# Patient Record
Sex: FEMALE | Race: BLACK OR AFRICAN AMERICAN | NOT HISPANIC OR LATINO | URBAN - METROPOLITAN AREA
[De-identification: names, ages, dates, MRNs, and addresses within clinical notes are randomized per-mention and may not be internally consistent; named-entity substitution may affect disease eponyms.]

---

## 2019-11-09 ENCOUNTER — EMERGENCY (EMERGENCY)
Facility: HOSPITAL | Age: 34
LOS: 1 days | Discharge: HOME | End: 2019-11-09
Attending: EMERGENCY MEDICINE | Admitting: EMERGENCY MEDICINE
Payer: COMMERCIAL

## 2019-11-09 VITALS
DIASTOLIC BLOOD PRESSURE: 84 MMHG | HEART RATE: 84 BPM | OXYGEN SATURATION: 100 % | RESPIRATION RATE: 17 BRPM | TEMPERATURE: 98 F | SYSTOLIC BLOOD PRESSURE: 145 MMHG

## 2019-11-09 VITALS
OXYGEN SATURATION: 100 % | TEMPERATURE: 98 F | SYSTOLIC BLOOD PRESSURE: 139 MMHG | DIASTOLIC BLOOD PRESSURE: 90 MMHG | HEART RATE: 94 BPM | RESPIRATION RATE: 16 BRPM

## 2019-11-09 PROCEDURE — 99282 EMERGENCY DEPT VISIT SF MDM: CPT | Mod: 25

## 2019-11-09 PROCEDURE — 12001 RPR S/N/AX/GEN/TRNK 2.5CM/<: CPT | Mod: F3

## 2019-11-09 RX ORDER — ACETAMINOPHEN 500 MG
650 TABLET ORAL ONCE
Refills: 0 | Status: COMPLETED | OUTPATIENT
Start: 2019-11-09 | End: 2019-11-09

## 2019-11-09 RX ADMIN — Medication 650 MILLIGRAM(S): at 18:17

## 2019-11-09 NOTE — ED PROVIDER NOTE - CLINICAL SUMMARY MEDICAL DECISION MAKING FREE TEXT BOX
34F no pmh presenting from [employer], is employee here, has superficial lac to left fourth finger medial aspect, no neurovascular complication, will dermabond after cleaning. No other issues, d/c after PEP packet with RN.

## 2019-11-09 NOTE — ED PROVIDER NOTE - PATIENT PORTAL LINK FT
You can access the FollowMyHealth Patient Portal offered by [facility] by registering at the following website: [email or web address]. By joining [employer]’s FollowMyHealth portal, you will also be able to view your health information using other applications (apps) compatible with our system.

## 2019-11-09 NOTE — ED PROVIDER NOTE - CARE PROVIDER_API CALL
Lukasz Schmitz (DO)  Plastic Surgery  [address] [place] [address]  Phone: [phone]  Fax: [phone]  Follow Up Time: Urgent

## 2019-11-09 NOTE — ED PROVIDER NOTE - OBJECTIVE STATEMENT
Just PTA patient was at a crash  in L&D when she had a scalpel injury to her proximal ring finger, approx 1cm, medial aspect. No other issues, no neuro compromise, no active bleeding.

## 2019-11-09 NOTE — ED ADULT NURSE NOTE - OBJECTIVE STATEMENT
Pt received to room 8. Pt came from [employer], is an employee, has a laceration to the left hand during a , was exposed to that pt's hand via scalpel. Pt appears to be in pain, Dr. Chavarria at bedside perform laceration repair. Radial pulses present to bilat, is able to wiggle all fingers on left hand. Pt is A&Ox4, ambulates independently. Respirations even & unlabored.

## 2019-11-09 NOTE — ED PROCEDURE NOTE - ATTENDING CONTRIBUTION TO CARE
Aayush:  I was present for the critical portions of this procedure and provided direct attending supervision.

## 2019-11-09 NOTE — ED PROVIDER NOTE - NSFOLLOWUPINSTRUCTIONS_ED_ALL_ED_FT
Keep the wound dry for the first 48 hours. Afterward, allow soapy water to run over the area, but do not scrub or immerse in water. Follow up with the hand surgeon. Return to this Emergency Department if you experience fever, redness around or streaking from the area, thick drainage, or any other emergency.

## 2019-11-09 NOTE — ED PROVIDER NOTE - PHYSICAL EXAMINATION
Gen: NAD, non-toxic, conversational  Eyes: PERRLA, EOMI   HENT: Normocephalic, atraumatic. External ears normal, no rhinorrhea, moist mucous membranes.   CV: RRR, no M/R/G  Resp: CTAB, non-labored, speaking without difficulty on room air  Abd: soft, non tender, non rigid, no guarding or rebound tenderness  Back: No CVAT bilaterally, no midline ttp  Skin: dry, wwp, left fourth digit medial aspect just distal to MCP oblique orientation superficial laceration   Neuro: AOx3, speech is fluent and appropriate, left fingers all intact extensor and flexor.   Psych: Mood concerned, affect euthymic

## 2019-11-09 NOTE — ED ADULT TRIAGE NOTE - CHIEF COMPLAINT QUOTE
arrives from  [facility]. [facility] blood exposure from scalpel during c section from pt.  laceration noted to l finger.

## 2019-11-09 NOTE — ED PROVIDER NOTE - NSCAREINITIATED _GEN_ER
"Adolescent Privilege Time    Date: ____[id number]_______________________    Time 12:30-1:00pm or __________________________    Skills Taught: ([place]) How to enjoy leisure activities    Other__________________________________________________________________      Behaviors Noted:([place])      Active     Introverted    Shy     Irritating  Rude       Spiteful    Interested    Apathetic       Impulsive  Bossy         Catty      Jolly    Impatient     Aggressive     Invasive    Opinionates   Careless   Argumentative    [place]       Inconsiderate  Distracted  Loud          Withdrawn  Took turns    Annoying      Reactive        Kind        Thoughtful  Lacks awareness of personal space    Explain: Patient participated in a game of \"Apples to Apples\" with peers and presented positive social behaviors.  No negative behaviors noted.  " Sussy Looney(Attending)

## 2019-11-09 NOTE — ED PROVIDER NOTE - ATTENDING CONTRIBUTION TO CARE
Aayush: 33 yo female s/p scalpel injury during a stat c section in L&D. Pt sustained laceration to left fourth digits. Vaccinations UTD including Tdap and Hepatitis B. NO other injuries or complaints. Exam: GENERAL: well appearing, NAD, HEENT: MMM, PERRLA, CARDIO: +S1/S2, no murmurs, rubs or gallops, LUNGS: CTA B/L, no wheezing, rales or rhonchi, ABD: soft, nontender, BSx4 quadrants, no guarding or rigidity. EXT: superficial fourth left didgit laceration on proximal medial aspect- minimal active bleeding, no sub q tissue exposed. NVI distally NEURO: AxOx3, SKIN: as documented above A/P- 33 yo female with occupation exposure and laceration. Pt does not require sutures- skin glue applied. vaccines UTD. pt give PEP and will f/u with employee health on monday.

## 2019-11-09 NOTE — ED ADULT NURSE NOTE - CHIEF COMPLAINT QUOTE
arrives from  [facility]. [facility] blood exposure from scalpel during c section from pt.  laceration noted to l finger.

## 2020-04-26 ENCOUNTER — MESSAGE (OUTPATIENT)
Age: 35
End: 2020-04-26

## 2020-05-11 LAB
SARS-COV-2 IGG SERPL IA-ACNC: 0.1 INDEX
SARS-COV-2 IGG SERPL QL IA: NEGATIVE

## 2021-03-10 ENCOUNTER — EMERGENCY (EMERGENCY)
Facility: HOSPITAL | Age: 36
LOS: 1 days | Discharge: HOME | End: 2021-03-10
Attending: EMERGENCY MEDICINE | Admitting: EMERGENCY MEDICINE
Payer: COMMERCIAL

## 2021-03-10 ENCOUNTER — OUTPATIENT (OUTPATIENT)
Dept: INPATIENT UNIT | Facility: HOSPITAL | Age: 36
LOS: 1 days | End: 2021-03-10
Payer: COMMERCIAL

## 2021-03-10 VITALS
DIASTOLIC BLOOD PRESSURE: 90 MMHG | SYSTOLIC BLOOD PRESSURE: 130 MMHG | TEMPERATURE: 99 F | HEART RATE: 63 BPM | OXYGEN SATURATION: 100 % | RESPIRATION RATE: 17 BRPM

## 2021-03-10 VITALS
SYSTOLIC BLOOD PRESSURE: 145 MMHG | DIASTOLIC BLOOD PRESSURE: 91 MMHG | TEMPERATURE: 98 F | RESPIRATION RATE: 18 BRPM | HEART RATE: 70 BPM | OXYGEN SATURATION: 100 %

## 2021-03-10 DIAGNOSIS — R55 SYNCOPE AND COLLAPSE: ICD-10-CM

## 2021-03-10 LAB
ALBUMIN SERPL ELPH-MCNC: 4 G/DL — SIGNIFICANT CHANGE UP (ref 3.3–5)
ALBUMIN SERPL ELPH-MCNC: 4.7 G/DL — SIGNIFICANT CHANGE UP (ref 3.3–5)
ALP SERPL-CCNC: 72 U/L — SIGNIFICANT CHANGE UP (ref 40–120)
ALP SERPL-CCNC: 85 U/L — SIGNIFICANT CHANGE UP (ref 40–120)
ALT FLD-CCNC: 11 U/L — SIGNIFICANT CHANGE UP (ref 4–33)
ALT FLD-CCNC: 14 U/L — SIGNIFICANT CHANGE UP (ref 4–33)
ANION GAP SERPL CALC-SCNC: 10 MMOL/L — SIGNIFICANT CHANGE UP (ref 7–14)
ANION GAP SERPL CALC-SCNC: 15 MMOL/L — HIGH (ref 7–14)
APTT BLD: 28.2 SEC — SIGNIFICANT CHANGE UP (ref 27–36.3)
AST SERPL-CCNC: 16 U/L — SIGNIFICANT CHANGE UP (ref 4–32)
AST SERPL-CCNC: 18 U/L — SIGNIFICANT CHANGE UP (ref 4–32)
BASOPHILS # BLD AUTO: 0.03 K/UL — SIGNIFICANT CHANGE UP (ref 0–0.2)
BASOPHILS # BLD AUTO: 0.04 K/UL — SIGNIFICANT CHANGE UP (ref 0–0.2)
BASOPHILS NFR BLD AUTO: 0.4 % — SIGNIFICANT CHANGE UP (ref 0–2)
BASOPHILS NFR BLD AUTO: 0.7 % — SIGNIFICANT CHANGE UP (ref 0–2)
BILIRUB SERPL-MCNC: 0.4 MG/DL — SIGNIFICANT CHANGE UP (ref 0.2–1.2)
BILIRUB SERPL-MCNC: 0.4 MG/DL — SIGNIFICANT CHANGE UP (ref 0.2–1.2)
BLD GP AB SCN SERPL QL: NEGATIVE — SIGNIFICANT CHANGE UP
BUN SERPL-MCNC: 10 MG/DL — SIGNIFICANT CHANGE UP (ref 7–23)
BUN SERPL-MCNC: 11 MG/DL — SIGNIFICANT CHANGE UP (ref 7–23)
CALCIUM SERPL-MCNC: 8.7 MG/DL — SIGNIFICANT CHANGE UP (ref 8.4–10.5)
CALCIUM SERPL-MCNC: 9.4 MG/DL — SIGNIFICANT CHANGE UP (ref 8.4–10.5)
CHLORIDE SERPL-SCNC: 100 MMOL/L — SIGNIFICANT CHANGE UP (ref 98–107)
CHLORIDE SERPL-SCNC: 104 MMOL/L — SIGNIFICANT CHANGE UP (ref 98–107)
CK MB BLD-MCNC: 1.1 % — SIGNIFICANT CHANGE UP (ref 0–2.5)
CK MB CFR SERPL CALC: 1.7 NG/ML — SIGNIFICANT CHANGE UP
CK SERPL-CCNC: 149 U/L — SIGNIFICANT CHANGE UP (ref 25–170)
CO2 SERPL-SCNC: 23 MMOL/L — SIGNIFICANT CHANGE UP (ref 22–31)
CO2 SERPL-SCNC: 24 MMOL/L — SIGNIFICANT CHANGE UP (ref 22–31)
CREAT SERPL-MCNC: 0.62 MG/DL — SIGNIFICANT CHANGE UP (ref 0.5–1.3)
CREAT SERPL-MCNC: 0.67 MG/DL — SIGNIFICANT CHANGE UP (ref 0.5–1.3)
EOSINOPHIL # BLD AUTO: 0.03 K/UL — SIGNIFICANT CHANGE UP (ref 0–0.5)
EOSINOPHIL # BLD AUTO: 0.04 K/UL — SIGNIFICANT CHANGE UP (ref 0–0.5)
EOSINOPHIL NFR BLD AUTO: 0.5 % — SIGNIFICANT CHANGE UP (ref 0–6)
EOSINOPHIL NFR BLD AUTO: 0.6 % — SIGNIFICANT CHANGE UP (ref 0–6)
GLUCOSE SERPL-MCNC: 81 MG/DL — SIGNIFICANT CHANGE UP (ref 70–99)
GLUCOSE SERPL-MCNC: 87 MG/DL — SIGNIFICANT CHANGE UP (ref 70–99)
HCT VFR BLD CALC: 37.3 % — SIGNIFICANT CHANGE UP (ref 34.5–45)
HCT VFR BLD CALC: 39.9 % — SIGNIFICANT CHANGE UP (ref 34.5–45)
HGB BLD-MCNC: 11.7 G/DL — SIGNIFICANT CHANGE UP (ref 11.5–15.5)
HGB BLD-MCNC: 13.2 G/DL — SIGNIFICANT CHANGE UP (ref 11.5–15.5)
IANC: 3.2 K/UL — SIGNIFICANT CHANGE UP (ref 1.5–8.5)
IANC: 4.38 K/UL — SIGNIFICANT CHANGE UP (ref 1.5–8.5)
IMM GRANULOCYTES NFR BLD AUTO: 0.2 % — SIGNIFICANT CHANGE UP (ref 0–1.5)
IMM GRANULOCYTES NFR BLD AUTO: 0.4 % — SIGNIFICANT CHANGE UP (ref 0–1.5)
INR BLD: 0.99 RATIO — SIGNIFICANT CHANGE UP (ref 0.88–1.16)
LACTATE SERPL-SCNC: 2.4 MMOL/L — HIGH (ref 0.5–2)
LYMPHOCYTES # BLD AUTO: 1.68 K/UL — SIGNIFICANT CHANGE UP (ref 1–3.3)
LYMPHOCYTES # BLD AUTO: 3.23 K/UL — SIGNIFICANT CHANGE UP (ref 1–3.3)
LYMPHOCYTES # BLD AUTO: 31.3 % — SIGNIFICANT CHANGE UP (ref 13–44)
LYMPHOCYTES # BLD AUTO: 39.2 % — SIGNIFICANT CHANGE UP (ref 13–44)
MAGNESIUM SERPL-MCNC: 1.9 MG/DL — SIGNIFICANT CHANGE UP (ref 1.6–2.6)
MAGNESIUM SERPL-MCNC: 2 MG/DL — SIGNIFICANT CHANGE UP (ref 1.6–2.6)
MCHC RBC-ENTMCNC: 28.5 PG — SIGNIFICANT CHANGE UP (ref 27–34)
MCHC RBC-ENTMCNC: 29.1 PG — SIGNIFICANT CHANGE UP (ref 27–34)
MCHC RBC-ENTMCNC: 31.4 GM/DL — LOW (ref 32–36)
MCHC RBC-ENTMCNC: 33.1 GM/DL — SIGNIFICANT CHANGE UP (ref 32–36)
MCV RBC AUTO: 88.1 FL — SIGNIFICANT CHANGE UP (ref 80–100)
MCV RBC AUTO: 91 FL — SIGNIFICANT CHANGE UP (ref 80–100)
MONOCYTES # BLD AUTO: 0.4 K/UL — SIGNIFICANT CHANGE UP (ref 0–0.9)
MONOCYTES # BLD AUTO: 0.52 K/UL — SIGNIFICANT CHANGE UP (ref 0–0.9)
MONOCYTES NFR BLD AUTO: 6.3 % — SIGNIFICANT CHANGE UP (ref 2–14)
MONOCYTES NFR BLD AUTO: 7.5 % — SIGNIFICANT CHANGE UP (ref 2–14)
NEUTROPHILS # BLD AUTO: 3.2 K/UL — SIGNIFICANT CHANGE UP (ref 1.8–7.4)
NEUTROPHILS # BLD AUTO: 4.38 K/UL — SIGNIFICANT CHANGE UP (ref 1.8–7.4)
NEUTROPHILS NFR BLD AUTO: 53.2 % — SIGNIFICANT CHANGE UP (ref 43–77)
NEUTROPHILS NFR BLD AUTO: 59.7 % — SIGNIFICANT CHANGE UP (ref 43–77)
NRBC # BLD: 0 /100 WBCS — SIGNIFICANT CHANGE UP
NRBC # BLD: 0 /100 WBCS — SIGNIFICANT CHANGE UP
NRBC # FLD: 0 K/UL — SIGNIFICANT CHANGE UP
NRBC # FLD: 0 K/UL — SIGNIFICANT CHANGE UP
PHOSPHATE SERPL-MCNC: 2.9 MG/DL — SIGNIFICANT CHANGE UP (ref 2.5–4.5)
PLATELET # BLD AUTO: 318 K/UL — SIGNIFICANT CHANGE UP (ref 150–400)
PLATELET # BLD AUTO: 380 K/UL — SIGNIFICANT CHANGE UP (ref 150–400)
POTASSIUM SERPL-MCNC: 3.3 MMOL/L — LOW (ref 3.5–5.3)
POTASSIUM SERPL-MCNC: 3.8 MMOL/L — SIGNIFICANT CHANGE UP (ref 3.5–5.3)
POTASSIUM SERPL-SCNC: 3.3 MMOL/L — LOW (ref 3.5–5.3)
POTASSIUM SERPL-SCNC: 3.8 MMOL/L — SIGNIFICANT CHANGE UP (ref 3.5–5.3)
PROT SERPL-MCNC: 6.9 G/DL — SIGNIFICANT CHANGE UP (ref 6–8.3)
PROT SERPL-MCNC: 8.2 G/DL — SIGNIFICANT CHANGE UP (ref 6–8.3)
PROTHROM AB SERPL-ACNC: 11.4 SEC — SIGNIFICANT CHANGE UP (ref 10.6–13.6)
RBC # BLD: 4.1 M/UL — SIGNIFICANT CHANGE UP (ref 3.8–5.2)
RBC # BLD: 4.53 M/UL — SIGNIFICANT CHANGE UP (ref 3.8–5.2)
RBC # FLD: 12 % — SIGNIFICANT CHANGE UP (ref 10.3–14.5)
RBC # FLD: 12.2 % — SIGNIFICANT CHANGE UP (ref 10.3–14.5)
RH IG SCN BLD-IMP: POSITIVE — SIGNIFICANT CHANGE UP
SARS-COV-2 RNA SPEC QL NAA+PROBE: SIGNIFICANT CHANGE UP
SODIUM SERPL-SCNC: 138 MMOL/L — SIGNIFICANT CHANGE UP (ref 135–145)
SODIUM SERPL-SCNC: 138 MMOL/L — SIGNIFICANT CHANGE UP (ref 135–145)
TROPONIN T, HIGH SENSITIVITY RESULT: 15 NG/L — SIGNIFICANT CHANGE UP
TROPONIN T, HIGH SENSITIVITY RESULT: 27 NG/L — SIGNIFICANT CHANGE UP
TROPONIN T, HIGH SENSITIVITY RESULT: 55 NG/L — CRITICAL HIGH
TROPONIN T, HIGH SENSITIVITY RESULT: <6 NG/L — SIGNIFICANT CHANGE UP
TSH SERPL-MCNC: 1.74 UIU/ML — SIGNIFICANT CHANGE UP (ref 0.27–4.2)
WBC # BLD: 5.36 K/UL — SIGNIFICANT CHANGE UP (ref 3.8–10.5)
WBC # BLD: 8.23 K/UL — SIGNIFICANT CHANGE UP (ref 3.8–10.5)
WBC # FLD AUTO: 5.36 K/UL — SIGNIFICANT CHANGE UP (ref 3.8–10.5)
WBC # FLD AUTO: 8.23 K/UL — SIGNIFICANT CHANGE UP (ref 3.8–10.5)

## 2021-03-10 PROCEDURE — 99233 SBSQ HOSP IP/OBS HIGH 50: CPT

## 2021-03-10 PROCEDURE — 99236 HOSP IP/OBS SAME DATE HI 85: CPT | Mod: 25

## 2021-03-10 PROCEDURE — 93010 ELECTROCARDIOGRAM REPORT: CPT

## 2021-03-10 PROCEDURE — 71045 X-RAY EXAM CHEST 1 VIEW: CPT | Mod: 26

## 2021-03-10 PROCEDURE — 93306 TTE W/DOPPLER COMPLETE: CPT | Mod: 26

## 2021-03-10 RX ORDER — METOPROLOL TARTRATE 50 MG
1 TABLET ORAL
Qty: 3 | Refills: 0
Start: 2021-03-10 | End: 2021-03-12

## 2021-03-10 NOTE — ED PROVIDER NOTE - ATTENDING CONTRIBUTION TO CARE
Afebrile. Awake and Alert. Lungs CTA. Heart RRR. Abdomen soft NTND. CN II-XII grossly intact. Moves all extremities without lateralization.    SVT per Rapid response improved with Labetolol  Monitor for arrythmia  Check electrolytes and TSH  No recent stimulant use  No h/o cardiovascular disease  CDU for echocardiogram and will needs Cardiology f/u

## 2021-03-10 NOTE — ED PROVIDER NOTE - PROGRESS NOTE DETAILS
Patient well appearing. Asymptomatic. No further palpitations. Trop 15 now after <6 when drawn upstairs. Expect this is due to exertion from SVT rather than CAD. Will repeat, send to CDU for echo and cards eval.

## 2021-03-10 NOTE — ED ADULT NURSE REASSESSMENT NOTE - NS ED NURSE REASSESS COMMENT FT1
Pt at baseline mental status, appears comfortable. NSR on monitor. RR even and unlabored. Pt denies CP, palpitations, SOB. Pt ambulatory with steady gait. Report given to CDU RN. Pt transported to area in stable condition at this time.

## 2021-03-10 NOTE — ED ADULT NURSE REASSESSMENT NOTE - NS ED NURSE REASSESS COMMENT FT1
Received report from GUALBERTO Pacheco. Pt A&OX4, appears comfortable. RR even and unlabored. NSR on monitor HR 77. Pt denies CP, palpitations, SOB. Labs drawn and sent. Covid swab sent. Will continue to monitor.

## 2021-03-10 NOTE — ED PROVIDER NOTE - PMH
1119:r received critical from lab, called provider at [phone]  Contacted NP No pertinent past medical history

## 2021-03-10 NOTE — ED CDU PROVIDER DISPOSITION NOTE - CARE PROVIDER_API CALL
Diallo Richardson)  Cardiac Electrophysiology; Cardiology; Internal Medicine  [phone] [place] [address]  Phone: [phone]  Fax: [phone]  Follow Up Time:

## 2021-03-10 NOTE — ED ADULT NURSE NOTE - OBJECTIVE STATEMENT
jamshid RN: pt received to room 3, was rapid response. pt employee in L&D, began having chest pain, SOB and palpations while at rest. denies PMH, previous occurrence of similar events. per RRT pt in SVT, now NSR on tele after fluids and IV meds. arrives with 18GIV right AC. pt asymptomatic at this ti general appearance improve significantly.

## 2021-03-10 NOTE — ED CDU PROVIDER INITIAL DAY NOTE - OBJECTIVE STATEMENT
35yF w/no stated pmhx brought to ED from rapid response after episode of SVT. Pt works at [employer], states she suddenly felt palpitations, chest tightness and some shortness of breath. She then syncopized, was on a stretcher at the time. Rapid response team found pt in SVT, gave 5mg Lopressor and SVT broke within seconds. Pt states since then she is feeling fine. Pt denies complaints at present. Pt denies family hx CAD, recent travel or illness, leg pain or swelling, current chest pain, shortness of breath, palpitations, abd pain, n/v/d, back pain or any other concerns.  Initial trop <6, rpt trop 15. EKG normal sinus rhythm.   Pt sent to CDU for echo, EP consult, tele monitoring

## 2021-03-10 NOTE — ED ADULT NURSE REASSESSMENT NOTE - NS ED NURSE REASSESS COMMENT FT1
Pt at baseline mental status, resting comfortably. RR even and unlabored. NSR on monitor HR 77. Pt transported for echo at this time.

## 2021-03-10 NOTE — ED PROVIDER NOTE - CLINICAL SUMMARY MEDICAL DECISION MAKING FREE TEXT BOX
Patient brought down from upstairs after reported palpitations and SVT. Asymptomatic now. Well appearing. Hemodynamically stable. Cardiopulmonary exam unremarkable. Will obtain labs, EKG, CXR.

## 2021-03-10 NOTE — ED CDU PROVIDER DISPOSITION NOTE - PATIENT PORTAL LINK FT
You can access the FollowMyHealth Patient Portal offered by [facility] by registering at the following website: [email or web address]. By joining [employer]’s FollowMyHealth portal, you will also be able to view your health information using other applications (apps) compatible with our system.

## 2021-03-10 NOTE — ED ADULT NURSE NOTE - CAS DISCH CONDITION
Stable Ivermectin Pregnancy And Lactation Text: This medication is Pregnancy Category C and it isn't known if it is safe during pregnancy. It is also excreted in breast milk.

## 2021-03-10 NOTE — ED CDU PROVIDER INITIAL DAY NOTE - PROGRESS NOTE DETAILS
Repeat trop 55, spoke with EP recommending treadmill stress test, pt ordered for test. Pt seen by EP attg, requested rpt troponin now. Repeat trop downtrending, now 27. EKG normal sinus. Pt refusing to stay overnight for treadmill stress test in the morning. EP saw pt at bedside, gave her appt for april. Pt follow up with her PMD, will rx metoprolol PRN for palpitations. Discharge discussed with

## 2021-03-10 NOTE — CONSULT NOTE ADULT - SUBJECTIVE AND OBJECTIVE BOX
Patient is a 35y old  Female who presents with a chief complaint of         HPI:  35 year old female with a hx of SARS-CoV-2 about 2 months ago presented to ED after one RRT event for SVT.  Patient describes this am she came to work as usual, she felt sudden onset of palpitations and felt very light-headed and chest discomfort.  Her co-worker called RRT and IV Lopressor was administered for SVT at 178 bpm.  HR was back to 80's after about 20-25 minutes.  Patient denies prior SVT history or palpitations.  EKG demonstrated NSR.  (SVT EKG not available).               PAST MEDICAL & SURGICAL HISTORY:  No pertinent past medical history  SARS-CoV-2 Dec 2020-Jan 2021    No significant past surgical history        MEDICATIONS  (STANDING):    MEDICATIONS  (PRN):    Allergies    No Known Allergies    Intolerances      FAMILY HISTORY:  No pertinent family history in first degree relatives        SOCIAL HISTORY:  Denies smoking; no   Alcohol  or  Drug abuse       REVIEW OF SYSTEMS:    CONSTITUTIONAL: No fever, weight loss, chills, shakes, or fatigue  EYES: No eye pain, visual disturbances, or discharge  ENMT:  No difficulty hearing, tinnitus, vertigo; No sinus or throat pain  NECK: No pain or stiffness  RESPIRATORY: No cough, wheezing, hemoptysis, or shortness of breath  CARDIOVASCULAR: No chest pain, dyspnea,  syncope, paroxysmal nocturnal dyspnea, orthopnea, or arm or leg swelling +palpitations, +dizziness,  GASTROINTESTINAL: No abdominal  or epigastric pain, nausea, vomiting, hematemesis, diarrhea, constipation, melena or bright red blood.  GENITOURINARY: No dysuria, nocturia, hematuria, or urinary incontinence  NEUROLOGICAL: No headaches, memory loss, slurred speech, limb weakness, loss of strength, numbness, or tremors  SKIN: No itching, burning, rashes, or lesions   LYMPH NODES: No enlarged glands  ENDOCRINE: No heat or cold intolerance, or hair loss  MUSCULOSKELETAL: No joint pain or swelling, muscle, back, or extremity pain  PSYCHIATRIC: No depression, anxiety, or difficulty sleeping  HEME/LYMPH: No easy bruising or bleeding gums  ALLERY AND IMMUNOLOGIC: No hives or rash.      Vital Signs Last 24 Hrs  T(C): 36.6 (10 Mar 2021 07:31), Max: 36.6 (10 Mar 2021 07:31)  T(F): 97.8 (10 Mar 2021 07:31), Max: 97.8 (10 Mar 2021 07:31)  HR: 70 (10 Mar 2021 07:31) (70 - 70)  BP: 145/91 (10 Mar 2021 07:31) (145/91 - 145/91)  BP(mean): --  RR: 18 (10 Mar 2021 07:31) (18 - 18)  SpO2: 100% (10 Mar 2021 07:31) (100% - 100%)    PHYSICAL EXAM:    GENERAL: In no apparent distress, well nourished, and hydrated.  HEAD:  Atraumatic, Normocephalic  NECK: Supple . No JVD or carotid bruit or thyroidmegaly.  Carotid pulse is 2+ bilaterally.  PULMONARY: Clear to auscultation and perfusion.  No rales, wheezing, or rhonchi bilaterally.  HEART: Regular rate and rhythm; No murmurs, rubs, or gallops.  ABDOMEN: Soft, Nontender, Nondistended; Bowel sounds present  EXTREMITIES: No clubbing, cyanosis, or edema  NEUROLOGICAL: Alert oriented to person, place and time.  Speech clear.  Skin: Dry intact, no rashes or lesions.          INTERPRETATION OF TELEMETRY:  Sinus rhythm 70, no recurrent SVT seen    ECG: NSR        LABS:                        11.7   5.36  )-----------( 318      ( 10 Mar 2021 08:30 )             37.3     03-10    138  |  104  |  10  ----------------------------<  87  3.8   |  24  |  0.62    Ca    8.7      10 Mar 2021 08:32  Phos  2.9     03-10  Mg     1.9     03-10    TPro  6.9  /  Alb  4.0  /  TBili  0.4  /  DBili  x   /  AST  16  /  ALT  11  /  AlkPhos  72  03-10    CARDIAC MARKERS ( 10 Mar 2021 08:01 )  x     / x     / 149 U/L / x     / 1.7 ng/mL      PT/INR - ( 10 Mar 2021 07:56 )   PT: 11.4 sec;   INR: 0.99 ratio         PTT - ( 10 Mar 2021 07:56 )  PTT:28.2 sec  Thyroid Stimulating Hormone, Serum (03.10.21 @ 08:32)    Thyroid Stimulating Hormone, Serum: 1.74 uIU/mL          BNP  RADIOLOGY & ADDITIONAL STUDIES:  FINDINGS:  The cardiac silhouette is normal in size. There are no focal consolidations or pleural effusions. The hilar and mediastinal structures appear unremarkable. The osseous structures are intact.    IMPRESSION: Clear lungs.          PREVIOUS DIAGNOSTIC TESTING:      ECHO FINDINGS:    STRESS FINDINGS:    CATHETERIZATION FINDINGS:

## 2021-03-10 NOTE — CONSULT NOTE ADULT - ATTENDING COMMENTS
35 year old female with a hx of SARS-CoV-2 about 2 months ago presented to ED after one RRT event for SVT.  Patient describes this am she came to work as usual, she felt sudden onset of palpitations and felt very light-headed and chest discomfort.  Her co-worker called RRT and IV Lopressor was administered for SVT at 178 bpm.  HR was back to 80's after about 20-25 minutes.  Patient denies prior SVT history or palpitations.  EKG demonstrated NSR.  (SVT EKG not available).  SVT with unclear etiology.  Possibly associated with recent SARS-CoV-2 infection. Will fu as outpt.

## 2021-03-10 NOTE — ED PROVIDER NOTE - OBJECTIVE STATEMENT
36 yo F with no PMH presents for palpitations now resolved and reported SVT. 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 Around 45 min ago she was at work when she had sudden onset palpitations associated with mild chest discomfort and SOB. She told coworkers who called a rapid response. Patient reports the next thing she remembers she was sitting with several people around her and feeling better. Here in the ED she reports feeling fine. Never had similar symptoms before.  Per triage note, pt was found to be in SVT and received 5 mg IV metoprolol.   Patient denies PMH including heart/lung/thyroid/blood clots/ drug or EtOH use. Recently has felt well.

## 2021-03-10 NOTE — RAPID RESPONSE TEAM SUMMARY - NSSITUATIONBACKGROUNDRRT_GEN_ALL_CORE
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      RRT called for tachycardia and chest pain. Upon arrival, patient appears uncomfortable, slightly diaphoretic. States she is having significant chest pain and tightness, along with dyspnea. VS with SpO2 100% on NRB, BP stable. HR 170s-180s. EKG demonstrating SVT. Given 5 mg IVP lopressor. SVT broke within seconds, with HR returning to 80s-90s. BP and SpO2 stable. Patient endorsed symptomatic improvement. Transferred to ED for further management.

## 2021-03-10 NOTE — ED CDU PROVIDER DISPOSITION NOTE - NSFOLLOWUPINSTRUCTIONS_ED_ALL_ED_FT
Follow up with EP doctor  on 4/22 at 1pm, office information provided above  Follow up with your primary care doctor within 1 week  Return to the ER with any worsening or concerning symptoms, chest pain, shortness of breath, palpitations, weakness or any other concerns. Follow up with EP doctor  on 4/22 at 1pm, office information provided above  Follow up with your primary care doctor within 1 week  Take Metoprolol 25mg (1 tablet) ONLY IF you feel palpitations and your heart rate is elevated   Return to the ER with any worsening or concerning symptoms, chest pain, shortness of breath, palpitations, weakness or any other concerns.

## 2021-03-10 NOTE — CONSULT NOTE ADULT - ASSESSMENT
35 year old female with a hx of SARS-CoV-2 about 2 months ago presented to ED after one RRT event for SVT.  Patient describes this am she came to work as usual, she felt sudden onset of palpitations and felt very light-headed and chest discomfort.  Her co-worker called RRT and IV Lopressor was administered for SVT at 178 bpm.  HR was back to 80's after about 20-25 minutes.  Patient denies prior SVT history or palpitations.  EKG demonstrated NSR.  (SVT EKG not available).  SVT with unclear etiology.  Possibly associated with recent SARS-CoV-2 infection.  -Telemetry monitor bed (short stay CDU)   -Echocardiogram to assess valvular heart disease   -Will discuss with Dr. Richardson regarding exercise stress test (Trop up to 15)  -Patient would benefit from a cardionet monitor as an outpatient  -Consider starting low dose BB Metoprolol 12.5 mg (uptitrate to 25mg)daily for rate control after cardiac workups

## 2021-03-10 NOTE — ED PROVIDER NOTE - NS ED ROS FT
Constitutional: No fever or Chills.   Head, Eyes, Ears, Nose, Throat: No visual changes.  No tongue or throat swelling or pain.  Neck: No neck stiffness.  Pulmonary: No shortness of breath except as above or cough.  No wheezing.  Cardiovascular: No chest pain or diaphoresis.  Abdominal: No abdominal pain. No nausea, vomiting, diarrhea.  No bloody or melenic stools.  Genitourinary: No dysuria or hematuria.   Allergic: No new exposures, mucosal swelling, or pruritis.  Neurologic: No headache, weakness, visual changes, speech changes, or sensory abnormalities.

## 2021-03-10 NOTE — ED ADULT TRIAGE NOTE - CHIEF COMPLAINT QUOTE
pt employee arrives as rapid response. Pt was c/o chest pain, palpitations, SOB, was found to be in SVT given 5 mg lopressor IV with rapid response team. Pt currently NSR on zoll denies any complaints. Arrives with 18 G PIV R AC LR bolus infusing. Brought directly to trauma B pt employee arrives as rapid response. Pt was c/o chest pain, palpitations, SOB, was found to be in SVT given 5 mg lopressor IV with rapid response team. Pt currently NSR on zoll denies any complaints. Arrives with 18 G PIV R AC LR bolus infusing. Brought directly to room 3

## 2021-03-10 NOTE — ED CDU PROVIDER INITIAL DAY NOTE - MEDICAL DECISION MAKING DETAILS
35yF w/no stated pmhx brought to ED from rapid response after episode of SVT that broke with 5mg IV lopressor. Pt has no complaints at present. EKG normal sinus. Initial trop <6, rpt trop 15. Pt sent to CDU for echo, EP consult, tele monitoring

## 2021-03-10 NOTE — ED ADULT NURSE NOTE - CHIEF COMPLAINT QUOTE
pt employee arrives as rapid response. Pt was c/o chest pain, palpitations, SOB, was found to be in SVT given 5 mg lopressor IV with rapid response team. Pt currently NSR on zoll denies any complaints. Arrives with 18 G PIV R AC LR bolus infusing. Brought directly to room 3

## 2021-03-19 DIAGNOSIS — Z3A.00 WEEKS OF GESTATION OF PREGNANCY NOT SPECIFIED: ICD-10-CM

## 2021-03-19 DIAGNOSIS — O26.899 OTHER SPECIFIED PREGNANCY RELATED CONDITIONS, UNSPECIFIED TRIMESTER: ICD-10-CM

## 2021-04-19 DIAGNOSIS — U07.1 COVID-19: ICD-10-CM

## 2021-04-19 DIAGNOSIS — B97.21 SARS-ASSOCIATED CORONAVIRUS AS THE CAUSE OF DISEASES CLASSIFIED ELSEWHERE: ICD-10-CM

## 2021-04-19 DIAGNOSIS — I47.1 SUPRAVENTRICULAR TACHYCARDIA: ICD-10-CM

## 2021-04-22 ENCOUNTER — APPOINTMENT (OUTPATIENT)
Dept: ELECTROPHYSIOLOGY | Facility: CLINIC | Age: 36
End: 2021-04-22

## 2021-07-14 ENCOUNTER — EMERGENCY (EMERGENCY)
Facility: HOSPITAL | Age: 36
LOS: 1 days | Discharge: LEFT BEFORE DISCHARGE | End: 2021-07-14
Admitting: EMERGENCY MEDICINE
Payer: COMMERCIAL

## 2021-07-14 VITALS
TEMPERATURE: 98 F | RESPIRATION RATE: 17 BRPM | SYSTOLIC BLOOD PRESSURE: 131 MMHG | OXYGEN SATURATION: 100 % | DIASTOLIC BLOOD PRESSURE: 85 MMHG | HEART RATE: 73 BPM

## 2021-07-14 PROCEDURE — L9991: CPT

## 2021-07-14 NOTE — ED ADULT TRIAGE NOTE - CHIEF COMPLAINT QUOTE
Pt c/o "something" stuck in throat after eating corn. Pt endorses trouble breathing. Pt breathing even and unlabored sating at 100% on RA. Airway patent. Pt s/p removal of left adrenal gland on Tuesday

## 2021-07-14 NOTE — ED ADULT TRIAGE NOTE - ED_CALLED_NO_RESPONSE_NOTE 3
pt was not observed leaving nor verbalized desire to leave. Pt noted to be gone when called to be revitaled never answered multiple overheads

## 2021-08-31 NOTE — ED PROCEDURE NOTE - CPROC ED WOUND CLOSURE1
Telephone call with Corey to review lab results. Continue same meds, bp recheck as scheduled in early September.   
tissue adhesive

## 2023-01-30 NOTE — ED PROVIDER NOTE - PHYSICAL EXAMINATION
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

## 2023-01-31 NOTE — ED ADULT NURSE NOTE - CAS TRG GEN SKIN CONDITION
Warm/Dry [Follow-Up - Routine Clinic] : a routine clinic follow-up of [None] : No associated symptoms are reported [Good Compliance] : good compliance with treatment [Good Tolerance] : good tolerance of treatment [Good Symptom Control] : good symptom control [TextBox_4] : Pt in [facility] ER with Covid + infection on 3/30/20 and sent home with abx. She had chest discomfort, cough and fevers at that time. She was seen by PCP one month later with persistent symptoms so was given abx again with resolution.\par Pt had Covid infection again 12/1/22 with cough, fevers, but no SOB, body aches, fatigue, or loss of taste/smell. Symptoms were milder this time. She has recovered and is back to baseline.\par \par She is s/p neck surgery and has recovered well. [de-identified] : [employer]

## 2025-06-24 ENCOUNTER — APPOINTMENT (OUTPATIENT)
Dept: PLASTIC SURGERY | Facility: CLINIC | Age: 40
End: 2025-06-24